# Patient Record
Sex: FEMALE | Race: WHITE | NOT HISPANIC OR LATINO | URBAN - METROPOLITAN AREA
[De-identification: names, ages, dates, MRNs, and addresses within clinical notes are randomized per-mention and may not be internally consistent; named-entity substitution may affect disease eponyms.]

---

## 2022-01-14 ENCOUNTER — TELEPHONE (OUTPATIENT)
Dept: BARIATRICS | Facility: CLINIC | Age: 37
End: 2022-01-14
Payer: COMMERCIAL

## 2022-01-14 NOTE — TELEPHONE ENCOUNTER
PT called and is interested in our program, however she is 5'11 1/2 and 273ish lbs. No co mobilities. PT stated she has PCOS, and has been in a medical weight loss program, taking many different meds and is still gaining weight.    I offer her the [facility] side, but she really is intertested in our program.     PT would like to speak with someone about this.    Please advise?

## 2022-01-14 NOTE — TELEPHONE ENCOUNTER
Spoke with patient.     Did an information session at [facility] and said he would be able to get her approved with PCOS as a diagnosis. We discussed that this would not be a qualifying comorbidity. .    She has been in a medical program for the last 5 years, doing medication, , etc.     She never had a sleep study. Discussed that this could be an option.    Discussed self pay prices.     She will think about it and call back to make an appointment.